# Patient Record
Sex: MALE | URBAN - METROPOLITAN AREA
[De-identification: names, ages, dates, MRNs, and addresses within clinical notes are randomized per-mention and may not be internally consistent; named-entity substitution may affect disease eponyms.]

---

## 2021-07-21 ENCOUNTER — APPOINTMENT (RX ONLY)
Dept: URBAN - METROPOLITAN AREA CLINIC 74 | Facility: CLINIC | Age: 31
Setting detail: DERMATOLOGY
End: 2021-07-21

## 2021-07-21 DIAGNOSIS — L40.0 PSORIASIS VULGARIS: ICD-10-CM

## 2021-07-21 DIAGNOSIS — L40.8 OTHER PSORIASIS: ICD-10-CM

## 2021-07-21 PROCEDURE — ? PRESCRIPTION

## 2021-07-21 PROCEDURE — ? PATIENT SPECIFIC COUNSELING

## 2021-07-21 PROCEDURE — 99204 OFFICE O/P NEW MOD 45 MIN: CPT

## 2021-07-21 PROCEDURE — ? DIAGNOSIS COMMENT

## 2021-07-21 PROCEDURE — ? ADDITIONAL NOTES

## 2021-07-21 PROCEDURE — ? COUNSELING

## 2021-07-21 PROCEDURE — ? TREATMENT REGIMEN

## 2021-07-21 RX ORDER — CLOBETASOL PROPIONATE 0.5 MG/G
CREAM TOPICAL
Qty: 1 | Refills: 1 | Status: ERX | COMMUNITY
Start: 2021-07-21

## 2021-07-21 RX ORDER — TRIAMCINOLONE ACETONIDE 0.25 MG/G
CREAM TOPICAL
Qty: 2 | Refills: 1 | Status: ERX | COMMUNITY
Start: 2021-07-21

## 2021-07-21 RX ADMIN — TRIAMCINOLONE ACETONIDE: 0.25 CREAM TOPICAL at 00:00

## 2021-07-21 RX ADMIN — CLOBETASOL PROPIONATE: 0.5 CREAM TOPICAL at 00:00

## 2021-07-21 ASSESSMENT — LOCATION DETAILED DESCRIPTION DERM
LOCATION DETAILED: SUPRAPUBIC SKIN
LOCATION DETAILED: LEFT DISTAL POSTERIOR UPPER ARM
LOCATION DETAILED: LEFT INFERIOR CENTRAL MALAR CHEEK
LOCATION DETAILED: RIGHT DISTAL POSTERIOR UPPER ARM
LOCATION DETAILED: LEFT SUPERIOR PARIETAL SCALP
LOCATION DETAILED: RIGHT INFERIOR CENTRAL MALAR CHEEK

## 2021-07-21 ASSESSMENT — LOCATION SIMPLE DESCRIPTION DERM
LOCATION SIMPLE: RIGHT CHEEK
LOCATION SIMPLE: SCALP
LOCATION SIMPLE: RIGHT UPPER ARM
LOCATION SIMPLE: GROIN
LOCATION SIMPLE: LEFT CHEEK
LOCATION SIMPLE: LEFT UPPER ARM

## 2021-07-21 ASSESSMENT — LOCATION ZONE DERM
LOCATION ZONE: SCALP
LOCATION ZONE: FACE
LOCATION ZONE: ARM
LOCATION ZONE: TRUNK

## 2021-07-21 ASSESSMENT — PGA PSORIASIS: PGA PSORIASIS 2020: MODERATE

## 2021-07-21 ASSESSMENT — BSA PSORIASIS: % BODY COVERED IN PSORIASIS: 18

## 2021-07-21 NOTE — PROCEDURE: TREATMENT REGIMEN
Detail Level: Zone
Action 2: Continue
Start Regimen: clobetasol 0.05 % topical cream. AAA on body bid x 7-10 days, break x 1 wk, repeat prn\\ntriamcinolone acetonide 0.025 % topical cream. Apply to affected areas on face and groin bid x 7 days, then break x 1 week. Repeat prn
Initiate Treatment: TAC 0.025% topical cream. Apply to affected areas on face bid x 1 week, then break x 1 week. Repeat prn

## 2021-07-21 NOTE — HPI: RASH
How Severe Is Your Rash?: moderate
Is This A New Presentation, Or A Follow-Up?: Rash
Additional History: Pt reports having itchy plaques on his skin that are located all over his body. Pt states that it started about 3 years ago on his face and started to spread. Pt has been using cortisone 10 qod/qd and switched all of his products/clothes to hypoallergenic alternatives.

## 2021-07-21 NOTE — PROCEDURE: DIAGNOSIS COMMENT
Render Risk Assessment In Note?: no
Comment: - will consider starting protopic or elidel to use on face, groin if as steroid sparing tx\\n- pt may need biologic or possibly otezla, will depend on results with topicals\\n
Detail Level: Simple
Comment: - will consider starting calcioptriene 0.005% crm for body\\n- advised may flare with stress\\n- pt denies joint pain

## 2021-07-21 NOTE — PROCEDURE: ADDITIONAL NOTES
Render Risk Assessment In Note?: no
Detail Level: Simple
Additional Notes: Psoriasis Vulgaris- This patient is currently presenting with a chronic uncontrolled worsening condition with an overall moderate to high risk of leading to social isolation, decreased self esteem, itching, bleeding, trouble sleeping at night, trouble paying attention at work/school. -Improper use of prescribed topical corticosteroid has a ongoing high risk of permanent skin atrophy and suppression of the hypothalamo–pituitary–adrenal (HPA) axis by excessive and/or unguided  use of the topical steroids, thus counseling and close monitoring is necessary - advised not to use topical steroids near eyes/face as can cause glaucoma and cataracts - - discussed that patients with plaque psoriasis are at a moderate to high increased risk for developing diabetes and cardiovascular disease , as well as psoriatic arthritis.  Stressed need for age appropriate surveillance testing with PCP- reviewed external and internal causes for a flare of psoriasis.  Reviewed importance of a healthy diet, sleep habits and exercise for better control of flares.  Informed patient that stress can contribute to worsening issues with skin psoriasis and reviewed ways to reduce daily stress at work and home. -Discussed that a Mediterranean diet high in fruits, vegetables, legumes, olive oil, fish and low in meat, dairy and alcohol can help decrease flares Atopic dermatitis- This patient is currently presenting with an chronic uncontrolled worsening condition with an overall moderate to high risk of leading to social isolation, decreased self esteem, itching, bleeding, trouble sleeping at night, trouble paying attention at work/school and breakdown of protective skin layer leading to increased infection. - Improper use of prescribed topical corticosteroid has a ongoing high risk of permanent skin atrophy and suppression of the hypothalamo–pituitary–adrenal (HPA) axis by excessive and/or unguided  use of the topical steroids, thus counseling and close monitoring is necessary \\n- advised not to use topical steroids near eyes/face as can cause glaucoma and cataracts \\n

## 2021-07-21 NOTE — PROCEDURE: MIPS QUALITY
Quality 110: Preventive Care And Screening: Influenza Immunization: Influenza immunization was not ordered or administered, reason not given
Quality 130: Documentation Of Current Medications In The Medical Record: Current Medications Documented
Detail Level: Detailed
Additional Notes: Pt received covid vaccine

## 2021-09-22 ENCOUNTER — APPOINTMENT (RX ONLY)
Dept: URBAN - METROPOLITAN AREA CLINIC 74 | Facility: CLINIC | Age: 31
Setting detail: DERMATOLOGY
End: 2021-09-22

## 2021-09-22 DIAGNOSIS — L40.0 PSORIASIS VULGARIS: ICD-10-CM | Status: IMPROVED

## 2021-09-22 DIAGNOSIS — L40.8 OTHER PSORIASIS: ICD-10-CM | Status: IMPROVED

## 2021-09-22 PROCEDURE — 99213 OFFICE O/P EST LOW 20 MIN: CPT

## 2021-09-22 PROCEDURE — ? ADDITIONAL NOTES

## 2021-09-22 PROCEDURE — ? TREATMENT REGIMEN

## 2021-09-22 PROCEDURE — ? PATIENT SPECIFIC COUNSELING

## 2021-09-22 PROCEDURE — ? COUNSELING

## 2021-09-22 ASSESSMENT — LOCATION ZONE DERM
LOCATION ZONE: ARM
LOCATION ZONE: TRUNK
LOCATION ZONE: SCALP
LOCATION ZONE: FACE

## 2021-09-22 ASSESSMENT — LOCATION DETAILED DESCRIPTION DERM
LOCATION DETAILED: LEFT DISTAL POSTERIOR UPPER ARM
LOCATION DETAILED: LEFT SUPERIOR PARIETAL SCALP
LOCATION DETAILED: LEFT INFERIOR CENTRAL MALAR CHEEK
LOCATION DETAILED: RIGHT INFERIOR CENTRAL MALAR CHEEK
LOCATION DETAILED: SUPRAPUBIC SKIN
LOCATION DETAILED: RIGHT DISTAL POSTERIOR UPPER ARM

## 2021-09-22 ASSESSMENT — LOCATION SIMPLE DESCRIPTION DERM
LOCATION SIMPLE: RIGHT UPPER ARM
LOCATION SIMPLE: LEFT CHEEK
LOCATION SIMPLE: LEFT UPPER ARM
LOCATION SIMPLE: SCALP
LOCATION SIMPLE: RIGHT CHEEK
LOCATION SIMPLE: GROIN

## 2021-09-22 ASSESSMENT — BSA PSORIASIS: % BODY COVERED IN PSORIASIS: 20

## 2021-09-22 NOTE — PROCEDURE: PATIENT SPECIFIC COUNSELING
- pt is content with using anti-dandruff shampoos; pt reports scalp is not bothersome\\n- pt can use TAC 0.025% for ears\\n- can cont to use TAC 0.025% cream for face
Detail Level: Zone
- pt is happy with results after using TAC 0.025% and Clobetasol 0.05% cream\\n- denies need for refills at this time\\n- cont TAC 0.025% for face and groin \\n- cont Clobetasol 0.05% cream for rest of body\\n- RTC in 2 mo

## 2021-09-22 NOTE — PROCEDURE: ADDITIONAL NOTES
Detail Level: Simple
Render Risk Assessment In Note?: no
Additional Notes: Psoriasis is chronic in nature with periods of remissions and flares. Flares can be triggered by stress, infections (group A strep),\\ncertain medications and alcohol.\\nContact office if: Psoriasis worsens, or fails to improve despite several months of treatment.\\n- This patient is currently presenting with a chronic uncontrolled worsening condition with an overall moderate to high risk of leading to social\\nisolation, decreased self esteem, itching, bleeding, trouble sleeping at night, trouble paying attention at work/school. -Improper use of prescribed\\ntopical corticosteroid has a ongoing high risk of permanent skin atrophy and suppression of the hypothalamo–pituitary–adrenal (HPA) axis by\\nexcessive and/or unguided use of the topical steroids, thus counseling and close monitoring is necessary - advised not to use topical steroids near\\neyes/face as can cause glaucoma and cataracts - - discussed that patients with plaque psoriasis are at a moderate to high increased risk for\\ndeveloping diabetes and cardiovascular disease , as well as psoriatic arthritis. Stressed need for age appropriate surveillance testing with PCP

## 2021-09-22 NOTE — PROCEDURE: TREATMENT REGIMEN
Detail Level: Zone
Continue Regimen: TAC 0.025% topical cream. Apply to affected areas on face bid x 1 week, then break x 1 week. Repeat prn
Action 2: Continue
Continue Regimen: clobetasol 0.05 % topical cream. AAA on body bid x 7-10 days, break x 1 wk, repeat prn\\ntriamcinolone acetonide 0.025 % topical cream. Apply to affected areas on face and groin bid x 7 days, then break x 1 week. Repeat prn

## 2022-03-29 ENCOUNTER — APPOINTMENT (RX ONLY)
Dept: URBAN - METROPOLITAN AREA CLINIC 74 | Facility: CLINIC | Age: 32
Setting detail: DERMATOLOGY
End: 2022-03-29

## 2022-03-29 DIAGNOSIS — L40.0 PSORIASIS VULGARIS: ICD-10-CM

## 2022-03-29 DIAGNOSIS — L40.8 OTHER PSORIASIS: ICD-10-CM | Status: INADEQUATELY CONTROLLED

## 2022-03-29 PROCEDURE — ? PATIENT SPECIFIC COUNSELING

## 2022-03-29 PROCEDURE — ? COUNSELING

## 2022-03-29 PROCEDURE — ? PRESCRIPTION

## 2022-03-29 PROCEDURE — ? TREATMENT REGIMEN

## 2022-03-29 PROCEDURE — 99214 OFFICE O/P EST MOD 30 MIN: CPT

## 2022-03-29 RX ORDER — KETOCONAZOLE 20 MG/G
CREAM TOPICAL BID
Qty: 60 | Refills: 2 | Status: ERX | COMMUNITY
Start: 2022-03-29

## 2022-03-29 RX ORDER — TACROLIMUS 1 MG/G
OINTMENT TOPICAL
Qty: 60 | Refills: 2 | Status: ERX | COMMUNITY
Start: 2022-03-29

## 2022-03-29 RX ADMIN — KETOCONAZOLE: 20 CREAM TOPICAL at 00:00

## 2022-03-29 RX ADMIN — TACROLIMUS: 1 OINTMENT TOPICAL at 00:00

## 2022-03-29 ASSESSMENT — LOCATION ZONE DERM
LOCATION ZONE: ARM
LOCATION ZONE: FACE
LOCATION ZONE: TRUNK
LOCATION ZONE: SCALP

## 2022-03-29 ASSESSMENT — LOCATION SIMPLE DESCRIPTION DERM
LOCATION SIMPLE: LEFT UPPER ARM
LOCATION SIMPLE: LEFT CHEEK
LOCATION SIMPLE: SCALP
LOCATION SIMPLE: RIGHT CHEEK
LOCATION SIMPLE: RIGHT UPPER ARM
LOCATION SIMPLE: GROIN

## 2022-03-29 ASSESSMENT — BSA PSORIASIS: % BODY COVERED IN PSORIASIS: 20

## 2022-03-29 ASSESSMENT — LOCATION DETAILED DESCRIPTION DERM
LOCATION DETAILED: SUPRAPUBIC SKIN
LOCATION DETAILED: LEFT DISTAL POSTERIOR UPPER ARM
LOCATION DETAILED: LEFT INFERIOR CENTRAL MALAR CHEEK
LOCATION DETAILED: RIGHT INFERIOR CENTRAL MALAR CHEEK
LOCATION DETAILED: LEFT SUPERIOR PARIETAL SCALP
LOCATION DETAILED: RIGHT DISTAL POSTERIOR UPPER ARM

## 2022-03-29 NOTE — PROCEDURE: TREATMENT REGIMEN
Detail Level: Zone
Initiate Treatment: ketoconazole 2 % topical cream BID: Apply twice daily to affected areas on face and over ears, PRN flares\\ntacrolimus 0.1 % topical ointment : Aaa over face and ears BID, prn with flare ups
English

## 2022-03-29 NOTE — PROCEDURE: PATIENT SPECIFIC COUNSELING
- pt currently using  anti-dandruff shampoos and TAC 0.025% cream w/ no adequate control\\n- reports TAC only clears up symptoms for 1-2 weeks \\n- pt concerned about long term steroid cream use over face \\n- notes frequent ear infections \\n- disc r/b/sed of tacrolimus oint vs. ketoconazole cr\\n- pt may start tacrolimus 0.1% oint - aaa over face and ears BID prn for flare ups \\n- pt to start Ketoconazole 2% cr - aaa over face BID\\n- RTC 6 months
Detail Level: Zone
- pt reports adequately controlled with Clobetasol 0.05% cream\\n- denies needing refills at this time